# Patient Record
Sex: MALE | Race: WHITE | Employment: FULL TIME | URBAN - METROPOLITAN AREA
[De-identification: names, ages, dates, MRNs, and addresses within clinical notes are randomized per-mention and may not be internally consistent; named-entity substitution may affect disease eponyms.]

---

## 2017-02-24 ENCOUNTER — OFFICE VISIT (OUTPATIENT)
Dept: OTHER | Facility: OUTPATIENT CENTER | Age: 62
End: 2017-02-24

## 2017-02-24 DIAGNOSIS — F43.23 ADJUSTMENT DISORDER WITH MIXED ANXIETY AND DEPRESSED MOOD: ICD-10-CM

## 2017-02-24 DIAGNOSIS — F65.51: Primary | ICD-10-CM

## 2017-02-24 ASSESSMENT — ANXIETY QUESTIONNAIRES
2. NOT BEING ABLE TO STOP OR CONTROL WORRYING: NOT AT ALL
GAD7 TOTAL SCORE: 2
7. FEELING AFRAID AS IF SOMETHING AWFUL MIGHT HAPPEN: NOT AT ALL
5. BEING SO RESTLESS THAT IT IS HARD TO SIT STILL: NOT AT ALL
3. WORRYING TOO MUCH ABOUT DIFFERENT THINGS: SEVERAL DAYS
1. FEELING NERVOUS, ANXIOUS, OR ON EDGE: NOT AT ALL
6. BECOMING EASILY ANNOYED OR IRRITABLE: NOT AT ALL
4. TROUBLE RELAXING: SEVERAL DAYS

## 2017-02-24 NOTE — PROGRESS NOTES
[facility] -  Case Progress Note    Client Name: Sav Herrera (Ramin)  YOB: 1955  MRN:  [id number]  Type of Session: Individual  Present in Session: client  Number of Minutes:  55  Date of Service: 2/24/17  The Treatment Plan (TP) was reviewed with the patient at today's visit. The TP remains current based on the patient's status and progress to date. Next TP due date: Feb 24, 2018.      Current Symptoms/Status:  Client is concerned about his interest in being dominated and how it fits into his life. Client has anxiety and depression in response to his divorce.    Progress Toward Treatment Goals:    He has been actively processing his emotions. He discussed boundaries with Patricia. He read [employer].    Intervention: Modality and Description:  CBT, family systems,  and psychodynamic techniques were used to help explore the client's emotions, family, and sexuality. Ramin talked about his conversation in setting boundaries with Patricia. He is realizing that he would not miss Patricia if she did not want to see him again. He affirmed that he would still see Patricia in person if she no longer wanted to have sex. He wonders if he would ever have the feeling of wanting to be with someone again. Ramin wonders what makes him happy--or sustainably happy. Therapist asked questions. He wakes up at 5:30am, gets up at 6am, does meditation and/or yoga, possibly masturbates, leaves at 7:30am, and gets to work at 8am. He takes an hour for lunch and sometimes has a shorter lunch or a working lunch. Ramin is not in [place] 6-16 days out of the month. On weekends, he reads, walks, goes swimming, or watches TV. He has enjoyed [employer] and [employer]. 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 Therapist observed that he does not spend time with a friend much and wondered if he wanted more. Ramin stated that he genuinely has fun with Joellen, but not with Patricia. He is struggling to understand this. Therapist  asked questions and suggested that he is realizing what he likes in a relationship and in friendship.         Response to Intervention:  Open. Verbal. Took feedback. Mildly anxious.      Assignment:  Maintain self-care. Continue to set boundaries with others. Past tasks: Consider exploring the [facility] community.    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        CAGE  Have you ever felt you should Cut down on your drinking or drug use?: No  Have people Annoyed you by criticizing your drinking or drug use?: No  Have you ever felt bad or Guilty about your drinking or drug use?: No  Have you ever had a drink or used drugs first thing in the morning to steady your nerves or to get rid of a hangover? (Eye opener): No  CAGE-AID SCORE: 0     PHQ-9:  PHQ-9 ([employer]) 2/24/2017   1.  Little interest or pleasure in doing things 0   2.  Feeling down, depressed, or hopeless 0   3.  Trouble falling or staying asleep, or sleeping too much 0   4.  Feeling tired or having little energy 0   5.  Poor appetite or overeating 0   6.  Feeling bad about yourself 0   7.  Trouble concentrating 0   8.  Moving slowly or restless 0   9.  Suicidal or self-harm thoughts 0   PHQ-9 Total Score 0         PHQ-9 SCORING CARE FOR SEVERITY  For health professional use only.     Scoring - add up all selected items on PHQ-9  For every item selected:  Not at all = 0  Several days = 1  More than half the days = 2  Nearly every day = 3      Interpretation of Total Score  Total Score Depression Severity and Recommendations   0-9 No Major Depression   10-14 Moderate.  Initial weekly follow up.  If patient is responding, monthly contacts.  Meds or therapy.   15-19 Moderate severe.  Initial weekly follow up.  If patient is responding, 2-4 week contacts.  Meds and/or therapy.   >20 Severe.  Weekly contact.  Meds and therapy.                GÓMEZ  1. Feeling nervous, anxious, or on edge: Not at all  2. Not being able to stop or control worrying: Not at all  3. Worrying too much about different things: Several days  4. Trouble relaxing: Several days  5. Being so restless that it is hard to sit still: Not at all  6. Becoming easily annoyed or irritable: Not at all  7. Feeling afraid, as if something awful might happen: Not at all    GÓMEZ-7 Total Score: 2    Interpretation:    GÓMEZ-7 total score for the 7 items ranges from 0 - 21.    0 - 5     Minimal anxiety  6 - 10   Mild anxiety  11 - 15 Moderate anxiety  16 - 21 Severe anxiety

## 2017-02-24 NOTE — MR AVS SNAPSHOT
After Visit Summary   2017    Sav Herrera    MRN: [id number]           Patient Information     Date Of Birth          1955        Visit Information        Provider Department      2017 8:00 AM Jose Alfredo Light, PhD  [facility]        Today's Diagnoses     Masochism    -  1    Adjustment disorder with mixed anxiety and depressed mood           Follow-ups after your visit        Who to contact     Please call your clinic at [phone] to:    Ask questions about your health    Make or cancel appointments    Discuss your medicines    Learn about your test results    Speak to your doctor   If you have compliments or concerns about an experience at your clinic, or if you wish to file a complaint, please contact [facility] Patient Relations at [phone] or email us at [email or web address]         Additional Information About Your Visit        MyChart Information     WhiteFence is an electronic gateway that provides easy, online access to your medical records. With WhiteFence, you can request a clinic appointment, read your test results, renew a prescription or communicate with your care team.     To sign up for Ne[employer]Weart visit the website at [email or web address]   You will be asked to enter the access code listed below, as well as some personal information. Please follow the directions to create your username and password.     Your access code is: [email or web address]  Expires: 3/20/2017  6:15 AM     Your access code will  in 90 days. If you need help or a new code, please contact your [facility] or call [phone] for assistance.        Care EveryWhere ID     This is your Care EveryWhere ID. This could be used by other organizations to access your [place] medical records  [id number]         Blood Pressure from Last 3 Encounters:   16 125/81   16 110/63    Weight from Last 3 Encounters:    12/20/16 77.1 kg (170 lb)   12/20/16 77.1 kg (170 lb)              We Performed the Following     Individual Psychotherapy (53+ min) [59453]        Primary Care Provider Office Phone # Fax #    Carl Norwood [phone]       [facility] [address] [place] [address]        Thank you!     Thank you for choosing [facility]  for your care. Our goal is always to provide you with excellent care. Hearing back from our patients is one way we can continue to improve our services. Please take a few minutes to complete the written survey that you may receive in the mail after your visit with us. Thank you!             Your Updated Medication List - Protect others around you: Learn how to safely use, store and throw away your medicines at www.disposemymeds.org.          This list is accurate as of: 2/24/17  8:56 AM.  Always use your most recent med list.                   Brand Name Dispense Instructions for use    diphenhydrAMINE 25 MG tablet    BENADRYL    60 tablet    Take 1-2 tablets (25-50 mg) by mouth every 6 hours as needed for itching or allergies       ENALAPRIL MALEATE PO

## 2017-02-25 ASSESSMENT — PATIENT HEALTH QUESTIONNAIRE - PHQ9: SUM OF ALL RESPONSES TO PHQ QUESTIONS 1-9: 0

## 2017-02-25 ASSESSMENT — ANXIETY QUESTIONNAIRES: GAD7 TOTAL SCORE: 2

## 2017-05-30 ENCOUNTER — OFFICE VISIT (OUTPATIENT)
Dept: OTHER | Facility: OUTPATIENT CENTER | Age: 62
End: 2017-05-30

## 2017-05-30 DIAGNOSIS — F65.51: Primary | ICD-10-CM

## 2017-05-30 DIAGNOSIS — F43.23 ADJUSTMENT DISORDER WITH MIXED ANXIETY AND DEPRESSED MOOD: ICD-10-CM

## 2017-05-30 NOTE — MR AVS SNAPSHOT
After Visit Summary   2017    Sav Herrera    MRN: [id number]           Patient Information     Date Of Birth          1955        Visit Information        Provider Department      2017 4:00 PM Jose Alfredo Light, PhD  [facility]        Today's Diagnoses     Masochism    -  1    Adjustment disorder with mixed anxiety and depressed mood           Follow-ups after your visit        Who to contact     Please call your clinic at [phone] to:    Ask questions about your health    Make or cancel appointments    Discuss your medicines    Learn about your test results    Speak to your doctor   If you have compliments or concerns about an experience at your clinic, or if you wish to file a complaint, please contact [facility] Patient Relations at [phone] or email us at [email or web address]         Additional Information About Your Visit        MyChart Information     [employer] Energy is an electronic gateway that provides easy, online access to your medical records. With Seven Energy, you can request a clinic appointment, read your test results, renew a prescription or communicate with your care team.     To sign up for Kind[employer] Bioscien[employer]est visit the website at [email or web address]   You will be asked to enter the access code listed below, as well as some personal information. Please follow the directions to create your username and password.     Your access code is: [email or web address]  Expires: 2017  4:54 PM     Your access code will  in 90 days. If you need help or a new code, please contact your [facility] or call [phone] for assistance.        Care EveryWhere ID     This is your Care EveryWhere ID. This could be used by other organizations to access your [place] medical records  [id number]         Blood Pressure from Last 3 Encounters:   16 125/81   16 110/63    Weight from Last 3 Encounters:    12/20/16 77.1 kg (170 lb)   12/20/16 77.1 kg (170 lb)              We Performed the Following     Individual Psychotherapy (53+ min) [88114]        Primary Care Provider Office Phone # Fax #    Carl Norwood [phone]       XXX NO INFO FOUND XXX [address] [address]        Thank you!     Thank you for choosing [facility]  for your care. Our goal is always to provide you with excellent care. Hearing back from our patients is one way we can continue to improve our services. Please take a few minutes to complete the written survey that you may receive in the mail after your visit with us. Thank you!             Your Updated Medication List - Protect others around you: Learn how to safely use, store and throw away your medicines at www.disposemymeds.org.          This list is accurate as of: 5/30/17  4:54 PM.  Always use your most recent med list.                   Brand Name Dispense Instructions for use    diphenhydrAMINE 25 MG tablet    BENADRYL    60 tablet    Take 1-2 tablets (25-50 mg) by mouth every 6 hours as needed for itching or allergies       ENALAPRIL MALEATE PO

## 2017-05-30 NOTE — PROGRESS NOTES
[facility] -  Case Progress Note    Client Name: Sav Herrera (Ramin)  YOB: 1955  MRN:  [id number]  Type of Session: Individual  Present in Session: client  Number of Minutes:  53  Date of Service: 5/30/17  The Treatment Plan (TP) was reviewed with the patient at today's visit. The TP remains current based on the patient's status and progress to date. Next TP due date: Feb 24, 2018.      Current Symptoms/Status:  Client is concerned about his interest in being dominated and how it fits into his life. Client has anxiety and depression in response to his divorce and the repercussions of it.    Progress Toward Treatment Goals:    He has been actively processing his emotions.     Intervention: Modality and Description:  CBT, family systems,  and psychodynamic techniques were used to help explore the client's emotions, family, and sexuality. Ramin reported that both Michael and Joellen have separately asked for more emotional intimacy in their relationships. They have also said that he does not take enough initiative in social activities. He thinks he is going through the motions with both women. Ramin has not done a bondage scene with Joellen lately. She has not asked him to buy her shoes recently because he has said no. He knows he would be disappointed but not heartbroken if either woman did not want to see him anymore. He wonders if he can love again. Ramin is not sure what he wants in each relationship. He gets together with Joellen once per week and he pays for everything (i.e., the meals and shows). She confronted him about not contacting her enough during a trip he took for 1.5 weeks. Therapist observed that this seems akin to the reaction of a woman who wants a relationship. Time was spent discussing how he may only want friendship with both michael and Joellen. Ramin is aware that his first thought is who will be more disappointed in him. Therapist advised him to tell them that he  only wants a friendship.      Response to Intervention:  Open. Verbal. Took feedback. Mildly anxious.      Assignment:  Maintain self-care. Continue to set boundaries with others. Past tasks: Consider exploring the [facility] community.    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

## 2017-08-03 ENCOUNTER — OFFICE VISIT (OUTPATIENT)
Dept: OTHER | Facility: OUTPATIENT CENTER | Age: 62
End: 2017-08-03

## 2017-08-03 DIAGNOSIS — F43.23 ADJUSTMENT DISORDER WITH MIXED ANXIETY AND DEPRESSED MOOD: ICD-10-CM

## 2017-08-03 DIAGNOSIS — F65.51: Primary | ICD-10-CM

## 2017-08-03 NOTE — MR AVS SNAPSHOT
After Visit Summary   8/3/2017    Sav Herrera    MRN: [id number]           Patient Information     Date Of Birth          1955        Visit Information        Provider Department      8/3/2017 9:00 AM Jose Alfredo Light, PhD  [facility]        Today's Diagnoses     Masochism    -  1    Adjustment disorder with mixed anxiety and depressed mood           Follow-ups after your visit        Who to contact     Please call your clinic at [phone] to:    Ask questions about your health    Make or cancel appointments    Discuss your medicines    Learn about your test results    Speak to your doctor   If you have compliments or concerns about an experience at your clinic, or if you wish to file a complaint, please contact [facility] Patient Relations at [phone] or email us at [email or web address]         Additional Information About Your Visit        MyChart Information     Sulfagenix is an electronic gateway that provides easy, online access to your medical records. With Sulfagenix, you can request a clinic appointment, read your test results, renew a prescription or communicate with your care team.     To sign up for BiOxyDynt visit the website at [email or web address]   You will be asked to enter the access code listed below, as well as some personal information. Please follow the directions to create your username and password.     Your access code is: [email or web address]  Expires: 2017  4:54 PM     Your access code will  in 90 days. If you need help or a new code, please contact your [facility] or call [phone] for assistance.        Care EveryWhere ID     This is your Care EveryWhere ID. This could be used by other organizations to access your [place] medical records  [id number]         Blood Pressure from Last 3 Encounters:   16 125/81   16 110/63    Weight from Last 3 Encounters:    12/20/16 77.1 kg (170 lb)   12/20/16 77.1 kg (170 lb)              We Performed the Following     Individual Psychotherapy (53+ min) [[id number]]        Primary Care Provider Office Phone # Fax #    Carl Norwood [phone]       XXX NO INFO FOUND XXX [address] [address]        Equal Access to Services     [facility]: Hadii aad ku hadasho Soomaali, waaxda luqadaha, qaybta kaalmada adeegyada, waxay idiin hayaan adeeg kharash la'aan ah. So [facility] [phone].    ATENCIÓN: Si habla bobo, tiene a collier disposición servicios gratuitos de asistencia lingüística. Llame al [phone].    We comply with applicable federal civil rights laws and [place] laws. We do not discriminate on the basis of race, color, national origin, age, disability sex, sexual orientation or gender identity.            Thank you!     Thank you for choosing [facility]  for your care. Our goal is always to provide you with excellent care. Hearing back from our patients is one way we can continue to improve our services. Please take a few minutes to complete the written survey that you may receive in the mail after your visit with us. Thank you!             Your Updated Medication List - Protect others around you: Learn how to safely use, store and throw away your medicines at www.disposemymeds.org.          This list is accurate as of: 8/3/17  9:54 AM.  Always use your most recent med list.                   Brand Name Dispense Instructions for use Diagnosis    diphenhydrAMINE 25 MG tablet    BENADRYL    60 tablet    Take 1-2 tablets (25-50 mg) by mouth every 6 hours as needed for itching or allergies        ENALAPRIL MALEATE PO

## 2017-11-22 ENCOUNTER — OFFICE VISIT (OUTPATIENT)
Dept: OTHER | Facility: OUTPATIENT CENTER | Age: 62
End: 2017-11-22

## 2017-11-22 DIAGNOSIS — F65.51: Primary | ICD-10-CM

## 2017-11-22 ASSESSMENT — ANXIETY QUESTIONNAIRES
5. BEING SO RESTLESS THAT IT IS HARD TO SIT STILL: NOT AT ALL
2. NOT BEING ABLE TO STOP OR CONTROL WORRYING: NOT AT ALL
6. BECOMING EASILY ANNOYED OR IRRITABLE: SEVERAL DAYS
7. FEELING AFRAID AS IF SOMETHING AWFUL MIGHT HAPPEN: NOT AT ALL
3. WORRYING TOO MUCH ABOUT DIFFERENT THINGS: NOT AT ALL
GAD7 TOTAL SCORE: 1
1. FEELING NERVOUS, ANXIOUS, OR ON EDGE: NOT AT ALL

## 2017-11-22 ASSESSMENT — PATIENT HEALTH QUESTIONNAIRE - PHQ9
SUM OF ALL RESPONSES TO PHQ QUESTIONS 1-9: 0
5. POOR APPETITE OR OVEREATING: NOT AT ALL

## 2017-11-22 NOTE — MR AVS SNAPSHOT
After Visit Summary   2017    Sav Herrera    MRN: [id number]           Patient Information     Date Of Birth          1955        Visit Information        Provider Department      2017 9:00 AM Jose Alfredo Light, PhD  [facility]        Today's Diagnoses     Masochism    -  1       Follow-ups after your visit        Who to contact     Please call your clinic at [phone] to:    Ask questions about your health    Make or cancel appointments    Discuss your medicines    Learn about your test results    Speak to your doctor   If you have compliments or concerns about an experience at your clinic, or if you wish to file a complaint, please contact [facility] Patient Relations at [phone] or email us at [email or web address]         Additional Information About Your Visit        MyChart Information     PluggedIn is an electronic gateway that provides easy, online access to your medical records. With PluggedIn, you can request a clinic appointment, read your test results, renew a prescription or communicate with your care team.     To sign up for PluggedIn visit the website at [email or web address]   You will be asked to enter the access code listed below, as well as some personal information. Please follow the directions to create your username and password.     Your access code is: [email or web address]  Expires: 2018  9:53 AM     Your access code will  in 90 days. If you need help or a new code, please contact your [facility] or call [phone] for assistance.        Care EveryWhere ID     This is your Care EveryWhere ID. This could be used by other organizations to access your [place] medical records  [id number]         Blood Pressure from Last 3 Encounters:   16 125/81   16 110/63    Weight from Last 3 Encounters:   16 77.1 kg (170 lb)   16 77.1 kg (170 lb)               We Performed the Following     Individual Psychotherapy (53+ min) [54689]        Primary Care Provider Office Phone # Fax #    Carl Norwood [phone]       XXX NO INFO FOUND XXX [address] [place] [address]        Equal Access to Services     [facility]: Hadii aad ku hadasho Soomaali, waaxda luqadaha, qaybta kaalmada adeegyada, waxay idiin hayaan adeeg kharash la'aan . So [facility] [phone].    ATENCIÓN: Si habla español, tiene a collier disposición servicios gratuitos de asistencia lingüística. Bernardino al [phone].    We comply with applicable federal civil rights laws and [place] laws. We do not discriminate on the basis of race, color, national origin, age, disability, sex, sexual orientation, or gender identity.            Thank you!     Thank you for choosing [facility] S[facility] HEALTH  for your care. Our goal is always to provide you with excellent care. Hearing back from our patients is one way we can continue to improve our services. Please take a few minutes to complete the written survey that you may receive in the mail after your visit with us. Thank you!             Your Updated Medication List - Protect others around you: Learn how to safely use, store and throw away your medicines at www.disposemymeds.org.          This list is accurate as of: 11/22/17  9:53 AM.  Always use your most recent med list.                   Brand Name Dispense Instructions for use Diagnosis    diphenhydrAMINE 25 MG tablet    BENADRYL    60 tablet    Take 1-2 tablets (25-50 mg) by mouth every 6 hours as needed for itching or allergies        ENALAPRIL MALEATE PO

## 2017-11-22 NOTE — PROGRESS NOTES
[facility] -  Case Progress Note    Client Name: Sav Herrera (Ramin)  YOB: 1955  MRN:  [id number]  Type of Session: Individual  Present in Session: client  Number of Minutes:  53  Date of Service: 11/22/17  The Treatment Plan (TP) remains current based on the patient's status and progress to date. Next TP due date: Feb 24, 2018.      Current Symptoms/Status:  Client is concerned about his interest in being dominated and how it fits into his life. Client has anxiety and depression in response to his divorce and the repercussions of it.    Progress Toward Treatment Goals:    He has been actively processing his emotions. Yoga and meditation has helped his distress. He attended a [employer] party.    Intervention: Modality and Description:  CBT, family systems,  and psychodynamic techniques were used to help explore the client's emotions, family, and sexuality. Ramin talked about how his mother is visiting. He has a younger brother in [place] who is more quiet and irritable than Ramin. Ramin plans to be more open with his mother during her visit. He talked about not feeling lonely. He is happy about his life. Still, he wonders if he will fall in love again. Friends and Joellen have asked him if he wants to be with someone when he is older. Therapist normalized his desire to be single. Ramin may use a [employer] website to date in future. He admitted that he may avoid some version of casual sex because he is concerned that the other person would want a relationship with him. Therapist expressed concern that he would avoid casual sex--and not live the life he wants--because he does not trust his ability to be assertive or set boundaries. Ramin wondered if he would ever have an association with a person that would cause him to not have contact with Patricia or Joellen. He would be okay such a scenario. He admitted that he sees online dating as artificial and thus dislikes it. Ramin admitted that  he is being philosophical and hypothetical in many of his questions and reflections today. He now knows a female coworker who is  and into kink. Therapist wondered if he would enjoy attending a [facility].       Response to Intervention:  Open. Verbal. Took feedback. Mildly anxious.      Assignment:  Maintain self-care. Continue to set boundaries with others. Past tasks: Consider exploring the [facility] community.    Diagnosis:  302.82 (F65.51) Masochism  309.28 Adjustment Disorder with Mixed Anxiety and Depressed Mood (not diagnosed today)    Plan / Need for Future Services:  Return for therapy in four weeks.

## 2017-11-23 ASSESSMENT — ANXIETY QUESTIONNAIRES: GAD7 TOTAL SCORE: 1

## 2018-04-12 ENCOUNTER — OFFICE VISIT (OUTPATIENT)
Dept: OTHER | Facility: OUTPATIENT CENTER | Age: 63
End: 2018-04-12
Payer: COMMERCIAL

## 2018-04-12 DIAGNOSIS — F65.51: Primary | ICD-10-CM

## 2018-04-12 NOTE — MR AVS SNAPSHOT
After Visit Summary   2018    Sav Herrera    MRN: [id number]           Patient Information     Date Of Birth          1955        Visit Information        Provider Department      2018 8:00 AM Jose Alfredo Light, PhD  [facility]        Today's Diagnoses     Masochism    -  1       Follow-ups after your visit        Who to contact     Please call your clinic at [phone] to:    Ask questions about your health    Make or cancel appointments    Discuss your medicines    Learn about your test results    Speak to your doctor            Additional Information About Your Visit        MyChart Information     [employer] is an electronic gateway that provides easy, online access to your medical records. With [employer], you can request a clinic appointment, read your test results, renew a prescription or communicate with your care team.     To sign up for [employer] visit the website at [email or web address]   You will be asked to enter the access code listed below, as well as some personal information. Please follow the directions to create your username and password.     Your access code is: [email or web address]  Expires: 2018  8:55 AM     Your access code will  in 90 days. If you need help or a new code, please contact your [facility] or call [phone] for assistance.        Care EveryWhere ID     This is your Care EveryWhere ID. This could be used by other organizations to access your [facility] medical records  [id number]         Blood Pressure from Last 3 Encounters:   16 125/81   16 110/63    Weight from Last 3 Encounters:   16 77.1 kg (170 lb)   16 77.1 kg (170 lb)              We Performed the Following     Individual Psychotherapy (53+ min) [64861]        Primary Care Provider    Carl Norwood MD       No address on file        Equal Access to Services     ESA BORJAS AH: Hadii isabel White  ramirez wilkinsonerwinestefanía jeffersonmarianne lacey ah. So [facility] [phone].    ATENCIÓN: Si livia broussard, tiene a collier disposición servicios gratuitos de asistencia lingüística. Llame al [phone].    We comply with applicable federal civil rights laws and [place] laws. We do not discriminate on the basis of race, color, national origin, age, disability, sex, sexual orientation, or gender identity.            Thank you!     Thank you for choosing [facility]  for your care. Our goal is always to provide you with excellent care. Hearing back from our patients is one way we can continue to improve our services. Please take a few minutes to complete the written survey that you may receive in the mail after your visit with us. Thank you!             Your Updated Medication List - Protect others around you: Learn how to safely use, store and throw away your medicines at www.disposemymeds.org.          This list is accurate as of 4/12/18  8:55 AM.  Always use your most recent med list.                   Brand Name Dispense Instructions for use Diagnosis    diphenhydrAMINE 25 MG tablet    BENADRYL    60 tablet    Take 1-2 tablets (25-50 mg) by mouth every 6 hours as needed for itching or allergies        ENALAPRIL MALEATE PO

## 2018-04-12 NOTE — PROGRESS NOTES
[facility] -  Case Progress Note    Client Name: Sav Herrera (Ramin)  YOB: 1955  MRN:  [id number]  Type of Session: Individual  Present in Session: client  Number of Minutes:  53  Date of Service: 4/12/18  The Treatment Plan (TP) remains current based on the patient's status and progress to date. Next TP due date: Feb 24, 2018.      Current Symptoms/Status:  Client is concerned about his interest in being dominated and how it fits into his life. Client has anxiety and depression in response to his divorce and the repercussions of it.    Progress Toward Treatment Goals:    He has been actively processing his emotions. Yoga and meditation has helped his distress. He is planning to attend a BD event.    Intervention: Modality and Description:  CBT, family systems,  and psychodynamic techniques were used to help explore the client's emotions, family, and sexuality. Ramin has been thinking about his values. He has enjoyed scenes with [employer] (T/[employer]). He gave her money to pay her taxes. Patricia asked him to explain things again. Ramin has fun with [employer] ([employer]), but thinks she is taking advantage of him. He wonders if he values superficial relationships. He dislikes saying that he does not trust Joellen's motives. Ramin thinks he never had a deep connection with Anahy and wonders if he ever could have a deep connection with someone. If he had a deep connection, he would want to be with someone and be excited by them. He had this with some women in the initially. Therapist reflected that he was describing initial attraction and this contrasted with his interest in having boundaries and with a true definition of deep intimacy. Ramin realized that he has deep intimacy with Nirav in [place]. He is open to this intimacy with a woman. Therapist noted that he may need to meet more women to give himself an opportunity to have the initial excitement he wants. Also, he was reminded to set boundaries with  individuals as needed.       Response to Intervention:  Open. Verbal. Took feedback. Mildly anxious.      Assignment:  Maintain self-care. Continue to set boundaries with others. Past tasks: Consider exploring the [facility] community.    Diagnosis:  302.82 (F65.51) Manny  309.28 Adjustment Disorder with Mixed Anxiety and Depressed Mood (not diagnosed today)    Plan / Need for Future Services:  Return for therapy in four weeks.

## 2018-05-16 ENCOUNTER — OFFICE VISIT (OUTPATIENT)
Dept: OTHER | Facility: OUTPATIENT CENTER | Age: 63
End: 2018-05-16
Payer: COMMERCIAL

## 2018-05-16 DIAGNOSIS — F65.51: Primary | ICD-10-CM

## 2018-05-16 NOTE — MR AVS SNAPSHOT
After Visit Summary   2018    Sav Herrera    MRN: [id number]           Patient Information     Date Of Birth          1955        Visit Information        Provider Department      2018 9:00 AM Jose Alfredo Light, PhD  [facility]        Today's Diagnoses     Masochism    -  1       Follow-ups after your visit        Who to contact     Please call your clinic at [phone] to:    Ask questions about your health    Make or cancel appointments    Discuss your medicines    Learn about your test results    Speak to your doctor            Additional Information About Your Visit        MyChart Information     [employer] is an electronic gateway that provides easy, online access to your medical records. With [employer], you can request a clinic appointment, read your test results, renew a prescription or communicate with your care team.     To sign up for [employer] visit the website at [email or web address]   You will be asked to enter the access code listed below, as well as some personal information. Please follow the directions to create your username and password.     Your access code is: [email or web address]  Expires: 2018  8:55 AM     Your access code will  in 90 days. If you need help or a new code, please contact your [facility] or call [phone] for assistance.        Care EveryWhere ID     This is your Care EveryWhere ID. This could be used by other organizations to access your [facility] medical records  [id number]         Blood Pressure from Last 3 Encounters:   16 125/81   16 110/63    Weight from Last 3 Encounters:   16 77.1 kg (170 lb)   16 77.1 kg (170 lb)              We Performed the Following     Individual Psychotherapy (53+ min) [70231]        Primary Care Provider    Carl Norwood MD       No address on file        Equal Access to Services     ESA BORJAS AH: isabel Chauhan  ramirez wilkinsonerwinestefanía jeffersonmarianne lacey ah. So [facility] [phone].    ATENCIÓN: Si livia broussard, tiene a collier disposición servicios gratuitos de asistencia lingüística. Llame al [phone].    We comply with applicable federal civil rights laws and [place] laws. We do not discriminate on the basis of race, color, national origin, age, disability, sex, sexual orientation, or gender identity.            Thank you!     Thank you for choosing [facility]  for your care. Our goal is always to provide you with excellent care. Hearing back from our patients is one way we can continue to improve our services. Please take a few minutes to complete the written survey that you may receive in the mail after your visit with us. Thank you!             Your Updated Medication List - Protect others around you: Learn how to safely use, store and throw away your medicines at www.disposemymeds.org.          This list is accurate as of 5/16/18  9:56 AM.  Always use your most recent med list.                   Brand Name Dispense Instructions for use Diagnosis    diphenhydrAMINE 25 MG tablet    BENADRYL    60 tablet    Take 1-2 tablets (25-50 mg) by mouth every 6 hours as needed for itching or allergies        ENALAPRIL MALEATE PO

## 2018-05-16 NOTE — PROGRESS NOTES
[facility] -  Case Progress Note    Client Name: Sav Herrera (Ramin)  YOB: 1955  MRN:  [id number]  Type of Session: Individual  Present in Session: client  Number of Minutes:  55  Date of Service: 5/16/18  The Treatment Plan (TP) remains current based on the patient's status and progress to date.      Current Symptoms/Status:  Client is concerned about his interest in being dominated and how it fits into his life. Client has anxiety and depression in response to his divorce and the repercussions of it.    Progress Toward Treatment Goals:    He has been actively processing his emotions. He completed an intimacy worksheet. He has been more social lately. He did not end up attending a BDSM event. His coworker friend Cristiane has been helpful.    Intervention: Modality and Description:  CBT, family systems,  and psychodynamic techniques were used to help explore the client's emotions, family, and sexuality. Ramin described his intimacy worksheet. He enjoys social intimacy with coworkers, Nirav, and his brother. He has good crisis intimacy. Ramin wonders why he was able to disappoint Patricia but is concerned about not wanting to disappoint Joellen. Therapist noted that Patricia wanted an emotional intimacy that is more uncomfortable for him--Joellen does not want this. He wants more sexual experiences than what he is having, but without complications. Therapist observed that he is avoiding the work involved in seeking such sex and he does not think what he wants is truly okay--and thus trust his assertiveness skills. Therapist also noted that he may not trust a woman's feelings even if she were to affirm she does not want commitment--he is uncomfortable with the intimacy nonetheless and does not trust what she says. Time was spent discussing how his desire to cut off all existing women is a false idea because what is more important is for him to exercise his sense of autonomy. Time was spent  discussing [facility] events. He has been to [place] and [place]. Ramin reported that he does like Joellen as a person.      Response to Intervention:  Open. Verbal. Took feedback. Mildly anxious.      Assignment:  Maintain self-care. Continue to set boundaries with others. Past tasks: Consider exploring the [facility] community.    Diagnosis:  302.82 (F65.51) Masochism  309.28 Adjustment Disorder with Mixed Anxiety and Depressed Mood (not diagnosed today)    Plan / Need for Future Services:  Return for therapy in four weeks.

## 2021-04-15 ENCOUNTER — IMMUNIZATION (OUTPATIENT)
Dept: NURSING | Facility: CLINIC | Age: 66
End: 2021-04-15
Payer: COMMERCIAL

## 2021-04-15 PROCEDURE — 0001A PR COVID VAC PFIZER DIL RECON 30 MCG/0.3 ML IM: CPT

## 2021-04-15 PROCEDURE — 91300 PR COVID VAC PFIZER DIL RECON 30 MCG/0.3 ML IM: CPT

## 2021-04-18 ENCOUNTER — HEALTH MAINTENANCE LETTER (OUTPATIENT)
Age: 66
End: 2021-04-18

## 2021-05-06 ENCOUNTER — IMMUNIZATION (OUTPATIENT)
Dept: NURSING | Facility: CLINIC | Age: 66
End: 2021-05-06
Attending: INTERNAL MEDICINE
Payer: COMMERCIAL

## 2021-05-06 PROCEDURE — 91300 PR COVID VAC PFIZER DIL RECON 30 MCG/0.3 ML IM: CPT

## 2021-05-06 PROCEDURE — 0002A PR COVID VAC PFIZER DIL RECON 30 MCG/0.3 ML IM: CPT

## 2021-10-02 ENCOUNTER — HEALTH MAINTENANCE LETTER (OUTPATIENT)
Age: 66
End: 2021-10-02

## 2022-05-14 ENCOUNTER — HEALTH MAINTENANCE LETTER (OUTPATIENT)
Age: 67
End: 2022-05-14

## 2022-09-03 ENCOUNTER — HEALTH MAINTENANCE LETTER (OUTPATIENT)
Age: 67
End: 2022-09-03

## 2023-01-15 ENCOUNTER — HEALTH MAINTENANCE LETTER (OUTPATIENT)
Age: 68
End: 2023-01-15

## 2024-02-17 ENCOUNTER — HEALTH MAINTENANCE LETTER (OUTPATIENT)
Age: 69
End: 2024-02-17